# Patient Record
Sex: MALE | Race: WHITE | NOT HISPANIC OR LATINO | URBAN - METROPOLITAN AREA
[De-identification: names, ages, dates, MRNs, and addresses within clinical notes are randomized per-mention and may not be internally consistent; named-entity substitution may affect disease eponyms.]

---

## 2018-08-10 ENCOUNTER — OFFICE VISIT - RIVER FALLS (OUTPATIENT)
Dept: FAMILY MEDICINE | Facility: CLINIC | Age: 23
End: 2018-08-10

## 2018-08-10 ASSESSMENT — MIFFLIN-ST. JEOR: SCORE: 2029.98

## 2022-02-12 VITALS
HEART RATE: 72 BPM | DIASTOLIC BLOOD PRESSURE: 68 MMHG | HEIGHT: 74 IN | TEMPERATURE: 98.7 F | SYSTOLIC BLOOD PRESSURE: 148 MMHG | BODY MASS INDEX: 27.59 KG/M2 | WEIGHT: 215 LBS

## 2022-02-15 NOTE — PROGRESS NOTES
Patient:   ALLIE BAXTER            MRN: [id number]            FIN: [id number]               Age:   23 years     Sex:  Male     :  1995   Associated Diagnoses:   Corneal abrasion; Eczema; Elevated blood pressure reading   Author:   Serenity Christensen      Chief Complaint   8/10/2018 2:33 PM CDT    Pt c/o R eye irritation. Pain started last night, this am watering, redness, some discharge in L eye. Also concerns with rash that appears on both feet around ankles - does not itch. Sx on/off since May.        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, does not remember specific event but dust and hay is consistently flying around,   discharge throughout day and this morning notice a small of discharge in elft eye also but this eye  does not hurt  no visual changes, does not wear contacts or glasses, no URI symptoms and denies allergies    occasional itchy rash on top of left foot, worse after working all day, started in May and is not present everyday    please note BP, pt tells me he has been seen by his home clinic for this in [place] but when he returns for rechecks BP is normal, no medication, denies OTC supplements      Review of Systems   Constitutional:  Negative.    Eye:  Negative except as documented in history of present illness.    Ear/Nose/Mouth/Throat:  Negative.    Respiratory:  Negative.    Cardiovascular:  Negative except as documented in history of present illness.    Gastrointestinal:  Negative.    Immunologic:  Negative.    Musculoskeletal:  Negative.    Integumentary:  Negative except as documented in history of present illness.    Neurologic:  Negative.    Psychiatric:  Negative.       Health Status   Allergies:    Allergic Reactions (Selected)  No Known Medication Allergies      Histories   Family History:    No family history items have been selected or recorded.      Physical Examination   Vital Signs    8/10/2018 2:33 PM CDT Temperature Tympanic 98.7 DegF    Peripheral Pulse Rate 72 bpm    Pulse Site Radial artery    HR Method Manual    Systolic Blood Pressure 152 mmHg  HI    Diastolic Blood Pressure 62 mmHg    Mean Arterial Pressure 92 mmHg    BP Site Right arm    BP Method Manual      Documented vital signs:       Blood Pressure: Systolic  148  mmHg, Diastolic  68  mmHg, Site ( Right arm ).    General:  Alert and oriented, No acute distress.    Eye:  Pupils are equal, round and reactive to light,     (Inserted Image. Unable to display) .         Visual acuity: Both eyes, 20/20.         Pupil: Both eyes, Within normal limits.         Eyelids: Right, Upper, mild swelling but no palpable stye, no evidence of cellulitis.         Conjunctiva: Left, Within normal limits.         Conjunctiva: Right, conjunctiva red and inflammed but no purulent discharge.         Cornea: Right, Corneal abrasion.         Sclera: Both eyes, injected with right eye worse than left.    HENT:  Normocephalic, Tympanic membranes are clear, Normal hearing, Oral mucosa is moist, No pharyngeal erythema, No sinus tenderness.    Neck:  Supple, Non-tender.    Cardiovascular:  Normal rate, Regular rhythm.    Integumentary:  Warm, Dry, Pink, top of left foot there is an erythematous non cellulitic patch 3.0cmx4.0cm.    Neurologic:  Alert, Oriented, Normal sensory.    Psychiatric:  Cooperative, Appropriate mood & affect, Normal judgment.       Impression and Plan   Diagnosis     Corneal abrasion (ESL20-XD S05.00XA).     Eczema (FHQ20-ZQ L30.9).     Elevated blood pressure reading (HCL51-LH R03.0).     Patient Instructions:       Counseled: Patient, Regarding diagnosis, Regarding treatment, Regarding medications, Activity, Verbalized understanding.    Summary:  1.  right eye corneal abrasion, i could not locate foreign body in eye but saw two small abrasions, explained membrane should heal quite quickly but may take 5d to completely resolve, will use  ofloxacin gtt to keep eye clear of bacteria,  if there is increased occular pain, change in visual field or unilateral headache on right side he should be seen immediately whether at our clinic, in UC or in ED  2.  left eye was not evaluated with proparacaine and black light,  this eye is red but not bothering him, if discharge should continue please treat left eye also  3.  BP is too high, please monitor BP outside of clinic, if SBP remains elevated about 135 he should be seen,  with his low weight and physical exercise, if he has HTN I would suspect this is hereditary but should have lab work to confirm no other issues,  although he reports having home clinic in [place] we would be happy to see him back and offer free BP checks  4.  eczema patch left foot, OTC hydrocortisone cream if itchy.  needs to remove sweaty socks and work boots more often as this is likely worsened with his daily work.    Orders     Orders (Selected)   Prescriptions  Prescribed  ofloxacin 0.3% ophthalmic solution: 2 drop(s), Both eyes, QID, # 10 mL, 0 Refill(s), Type: Maintenance, Pharmacy: [facility] [id number], 2 drop(s) Eye-Both qid.